# Patient Record
Sex: FEMALE | Race: WHITE
[De-identification: names, ages, dates, MRNs, and addresses within clinical notes are randomized per-mention and may not be internally consistent; named-entity substitution may affect disease eponyms.]

---

## 2021-01-06 ENCOUNTER — HOSPITAL ENCOUNTER (EMERGENCY)
Dept: HOSPITAL 62 - ER | Age: 31
Discharge: HOME | End: 2021-01-06
Payer: SELF-PAY

## 2021-01-06 VITALS — SYSTOLIC BLOOD PRESSURE: 114 MMHG | DIASTOLIC BLOOD PRESSURE: 62 MMHG

## 2021-01-06 DIAGNOSIS — R31.9: ICD-10-CM

## 2021-01-06 DIAGNOSIS — R11.10: ICD-10-CM

## 2021-01-06 DIAGNOSIS — R10.819: ICD-10-CM

## 2021-01-06 DIAGNOSIS — R10.9: ICD-10-CM

## 2021-01-06 DIAGNOSIS — F17.200: ICD-10-CM

## 2021-01-06 DIAGNOSIS — N12: Primary | ICD-10-CM

## 2021-01-06 LAB
ADD MANUAL DIFF: NO
ALBUMIN SERPL-MCNC: 4 G/DL (ref 3.5–5)
ALP SERPL-CCNC: 65 U/L (ref 38–126)
ANION GAP SERPL CALC-SCNC: 7 MMOL/L (ref 5–19)
APPEARANCE UR: (no result)
APTT PPP: (no result) S
AST SERPL-CCNC: 15 U/L (ref 14–36)
BASOPHILS # BLD AUTO: 0.1 10^3/UL (ref 0–0.2)
BASOPHILS NFR BLD AUTO: 0.7 % (ref 0–2)
BILIRUB DIRECT SERPL-MCNC: 0.2 MG/DL (ref 0–0.4)
BILIRUB SERPL-MCNC: 0.4 MG/DL (ref 0.2–1.3)
BILIRUB UR QL STRIP: NEGATIVE
BUN SERPL-MCNC: 8 MG/DL (ref 7–20)
CALCIUM: 9 MG/DL (ref 8.4–10.2)
CHLORIDE SERPL-SCNC: 100 MMOL/L (ref 98–107)
CO2 SERPL-SCNC: 29 MMOL/L (ref 22–30)
EOSINOPHIL # BLD AUTO: 0 10^3/UL (ref 0–0.6)
EOSINOPHIL NFR BLD AUTO: 0.5 % (ref 0–6)
ERYTHROCYTE [DISTWIDTH] IN BLOOD BY AUTOMATED COUNT: 17 % (ref 11.5–14)
GLUCOSE SERPL-MCNC: 121 MG/DL (ref 75–110)
GLUCOSE UR STRIP-MCNC: NEGATIVE MG/DL
HCT VFR BLD CALC: 30.2 % (ref 36–47)
HGB BLD-MCNC: 10.3 G/DL (ref 12–15.5)
KETONES UR STRIP-MCNC: NEGATIVE MG/DL
LYMPHOCYTES # BLD AUTO: 1.4 10^3/UL (ref 0.5–4.7)
LYMPHOCYTES NFR BLD AUTO: 18.3 % (ref 13–45)
MCH RBC QN AUTO: 28 PG (ref 27–33.4)
MCHC RBC AUTO-ENTMCNC: 34.2 G/DL (ref 32–36)
MCV RBC AUTO: 82 FL (ref 80–97)
MONOCYTES # BLD AUTO: 0.5 10^3/UL (ref 0.1–1.4)
MONOCYTES NFR BLD AUTO: 6.5 % (ref 3–13)
NEUTROPHILS # BLD AUTO: 5.5 10^3/UL (ref 1.7–8.2)
NEUTS SEG NFR BLD AUTO: 74 % (ref 42–78)
NITRITE UR QL STRIP: NEGATIVE
PH UR STRIP: 5 [PH] (ref 5–9)
PLATELET # BLD: 462 10^3/UL (ref 150–450)
POTASSIUM SERPL-SCNC: 3.7 MMOL/L (ref 3.6–5)
PROT SERPL-MCNC: 7.1 G/DL (ref 6.3–8.2)
PROT UR STRIP-MCNC: 100 MG/DL
RBC # BLD AUTO: 3.69 10^6/UL (ref 3.72–5.28)
SP GR UR STRIP: 1.03
TOTAL CELLS COUNTED % (AUTO): 100 %
UROBILINOGEN UR-MCNC: 2 MG/DL (ref ?–2)
WBC # BLD AUTO: 7.4 10^3/UL (ref 4–10.5)

## 2021-01-06 PROCEDURE — 36415 COLL VENOUS BLD VENIPUNCTURE: CPT

## 2021-01-06 PROCEDURE — 85025 COMPLETE CBC W/AUTO DIFF WBC: CPT

## 2021-01-06 PROCEDURE — 81001 URINALYSIS AUTO W/SCOPE: CPT

## 2021-01-06 PROCEDURE — 99283 EMERGENCY DEPT VISIT LOW MDM: CPT

## 2021-01-06 PROCEDURE — 83690 ASSAY OF LIPASE: CPT

## 2021-01-06 PROCEDURE — S0119 ONDANSETRON 4 MG: HCPCS

## 2021-01-06 PROCEDURE — 84703 CHORIONIC GONADOTROPIN ASSAY: CPT

## 2021-01-06 PROCEDURE — 80053 COMPREHEN METABOLIC PANEL: CPT

## 2021-01-06 PROCEDURE — 81025 URINE PREGNANCY TEST: CPT

## 2021-01-06 PROCEDURE — 87086 URINE CULTURE/COLONY COUNT: CPT

## 2021-01-06 NOTE — ER DOCUMENT REPORT
ED General





- General


Chief Complaint: Abdominal Pain


Stated Complaint: ABDOMINAL PAIN


Time Seen by Provider: 01/06/21 14:41


Primary Care Provider: 


DIANA NEWELL MD [ACTIVE STAFF] - Follow up as needed


Mode of Arrival: Ambulatory


TRAVEL OUTSIDE OF THE [place] IN LAST 30 DAYS: No





- HPI


Notes: 





30-year-old female presents with right-sided abdominal pain.  Patient states 

that for the past 4 to 5 days she has had pain to the right side of her abdomen.

 Is sharp and stabbing, states that when it is intense it knocks her breath 

away.  Pain radiates up and down her right side.  No medications for pain.  She 

had 2 episodes of vomiting yesterday.  No diarrhea.  No fever.  No dysuria or 

vaginal discharge.  She reports concerned she may pregnant as she had an 

abnormally long period lasting 8 days, then yesterday she saw some flecks of 

blood when wiping.





- Related Data


Allergies/Adverse Reactions: 


                                        





No Known Allergies Allergy (Verified 01/06/21 14:39)


   











Past Medical History





- General


Information source: Patient





- Social History


Smoking Status: Current Every Day Smoker


Family History: Reviewed & Not Pertinent


Patient has homicidal ideation: No





Review of Systems





- Review of Systems


Constitutional: denies: Fever


EENT: No symptoms reported


Cardiovascular: No symptoms reported


Respiratory: No symptoms reported


Gastrointestinal: See HPI


Genitourinary: Flank pain.  denies: Dysuria


Female Genitourinary: No symptoms reported


Musculoskeletal: No symptoms reported


Skin: No symptoms reported


Hematologic/Lymphatic: No symptoms reported


Neurological/Psychological: No symptoms reported





Physical Exam





- Vital signs


Vitals: 


                                        











Temp Pulse Resp BP Pulse Ox


 


 98.7 F   91   16   107/64   100 


 


 01/06/21 13:16  01/06/21 13:16  01/06/21 13:16  01/06/21 13:16  01/06/21 13:16














- General


General appearance: Appears well, Alert


In distress: None





- HEENT


Head: Normocephalic, Atraumatic


Extraocular movements intact: Yes


Pupils: PERRL





- Respiratory


Breath sounds: Normal





- Cardiovascular


Rhythm: Regular


Heart sounds: Normal auscultation





- Abdominal


Distension: No distension


Tenderness: Tender - Right flank.  No: Guarding, Rebound





- Back


Back: CVA tenderness





- Extremities


General upper extremity: Normal ROM


General lower extremity: Normal ROM





- Neurological


Neuro grossly intact: Yes


Cognition: Normal


Orientation: AAOx4





- Psychological


Associated symptoms: Normal affect





- Skin


Skin Temperature: Warm





Course





- Re-evaluation


Re-evalutation: 





30-year-old female with right flank/right-sided abdominal pain x4-5 days.  Pat

caden is afebrile, hemodynamically stable.  She is alert and well-appearing, 

nontoxic.  She does have right CVA tenderness and tenderness essentially to the 

right side of her abdomen.  Labs done through triage, there is no leukocytosis 

or left shift, her urine has marked pyuria and some hematuria as well, pregnancy

test is negative.  Discussed with patient urinary tract infection is present, 

clinically has pyelonephritis, I discussed with her that a CT abdomen is 

indicated as I want to make sure that there is no ureteral stone present or even

abnormal presentation of ascites.  Patient has refused CT and would like to 

leave the emergency department at this time.  She states that her mother drove 

her to the emergency department today and does not drive at night, additionally 

her 1-year-old son is in the car and they must get home.  Patient states that 

she will return tomorrow for CT scan.  Patient adamant about her decision and 

refused to stay, she is alert and oriented and capable of making medical 

decisions.  She was given a dose of Keflex and prescribed an extended course.  

Strict return precautions given, stable time of departure from the emergency 

department.





- Vital Signs


Vital signs: 


                                        











Temp Pulse Resp BP Pulse Ox


 


 98 F   82   18   114/62   100 


 


 01/06/21 18:29  01/06/21 20:10  01/06/21 20:10  01/06/21 20:10  01/06/21 20:10














- Laboratory Results


Result Diagrams: 


                                 01/06/21 16:17





                                 01/06/21 16:17


Laboratory Results Interpreted: 


                                        











  01/06/21 01/06/21 01/06/21





  14:00 16:17 16:17


 


RBC   3.69 L 


 


Hgb   10.3 L 


 


Hct   30.2 L 


 


RDW   17.0 H 


 


Plt Count   462 H 


 


Sodium    136.3 L


 


Glucose    121 H


 


Urine Protein  100 H  


 


Urine Blood  MODERATE H  


 


Urine Urobilinogen  2.0 H  


 


Ur Leukocyte Esterase  LARGE H  











Critical Laboratory Results Reviewed: No Critical Results





- Radiology Results


Critical Radiology Results Reviewed: No Critical Results





Discharge





- Discharge


Clinical Impression: 


 Pyelonephritis





Disposition: HOME, SELF-CARE


Additional Instructions: 


Please begin course of Keflex for infection, you may also continue ibuprofen for

pain.  Please return to the emergency department soon as possible for further 

work-up, which would include a CT to assess for stone.  Be sure to drink plenty 

of fluids.


Prescriptions: 


Ibuprofen [Ibu] 600 mg PO Q6H PRN #60 tablet


 PRN Reason: 


Cephalexin Monohydrate [Keflex 500 mg Capsule] 500 mg PO BID 10 Days #20 capsule


Referrals: 


DIANA NEWELL MD [ACTIVE STAFF] - Follow up as needed

## 2021-01-06 NOTE — ER DOCUMENT REPORT
ED Medical Screen (RME)





- General


Chief Complaint: Abdominal Pain


Stated Complaint: ABDOMINAL PAIN


Time Seen by Provider: 01/06/21 14:41


Primary Care Provider: 


DIANA NEWELL MD [Primary Care Provider] - Follow up as needed


Mode of Arrival: Ambulatory


Information source: Patient


Notes: 





HPI; 30-year-old female presents to the emergency room complaining of sharp 

right-sided abdominal pain for the past 4 days.  Complains of nausea but no 

vomiting.  Has not taken any medications for her symptoms.  Denies any COVID-19 

exposure.  Concern for pregnancy.





PE:


Alert and oriented x3.  Lungs: Clear to auscultation without rales, rhonchi, 

wheezes.  Heart: Regular rate rhythm without murmurs, rubs, gallops.





I have greeted and performed a rapid initial assessment of this patient.  A 

comprehensive ED assessment and evaluation of the patient, analysis of test 

results and completion of the medical decision making process will be conducted 

by additional ED providers.  I have specifically instructed the patient or 

family members with the patient to immediately return to any nursing staff 

should anything change in the patient's condition or with their chief complaint.


TRAVEL OUTSIDE OF THE [place] IN LAST 30 DAYS: No





- Related Data


Allergies/Adverse Reactions: 


                                        





No Known Allergies Allergy (Verified 01/06/21 14:39)


   











Physical Exam





- Vital signs


Vitals: 





                                        











Temp Pulse Resp BP Pulse Ox


 


 98.7 F   91   16   107/64   100 


 


 01/06/21 13:16  01/06/21 13:16  01/06/21 13:16  01/06/21 13:16  01/06/21 13:16














Course





- Vital Signs


Vital signs: 





                                        











Temp Pulse Resp BP Pulse Ox


 


 98.7 F   91   16   107/64   100 


 


 01/06/21 13:16  01/06/21 13:16  01/06/21 13:16  01/06/21 13:16  01/06/21 13:16














Doctor's Discharge





- Discharge


Referrals: 


DIANA NEWELL MD [Primary Care Provider] - Follow up as needed